# Patient Record
(demographics unavailable — no encounter records)

---

## 2025-02-24 NOTE — PHYSICAL EXAM
[Normal S1, S2] : normal S1, S2 [No Rub] : no rub [No Gallop] : no gallop [Murmur] : murmur [Normal] : alert and oriented, normal memory [de-identified] : l/Vl systolic

## 2025-02-24 NOTE — HISTORY OF PRESENT ILLNESS
[FreeTextEntry1] : The patient is a 69-year-old man referred by primary care provider for cardiology evaluation due to concern for dyspnea on exertion, cough and hyperlipidemia. Patient endorses that he was in his usual state of good health until about 2 weeks ago.  He started developing a cough and then was having intermittent feelings of "gasping".  He states that if he walked a short to moderate distance, he would feel short of breath.  Cough lasted for about a day and then resolved on its own. Since then, he has been feeling better, though still has dyspnea especially after going up inclines or stairs. Dyspnea with exertion at times has been associated with tightness in his chest. No resting symptoms. No complaints of palpitations, dizziness, lightheadedness, syncope or near syncope.  No edema, orthopnea.  No PND.  Past history: No prior history of CAD, MI, CHF, arrhythmias or heart murmurs. He did have an exercise myocardial perfusion stress test done in 2022, normal results (in the chart). Denies hypertension, diabetes or vascular disease. Denies any major medical illnesses. Status post appendectomy about 20 years ago. Current medications: Pantoprazole. Allergies: None. Social history: Non-smoker.  Social alcohol.  He is a  in the [employer], currently working in [employer].  Single, no children.  Native of [place]. Family history: Father and brother had heart disease.

## 2025-02-24 NOTE — DISCUSSION/SUMMARY
[FreeTextEntry1] : 69-year-old man referred for cardiology evaluation due to concern for recent onset of cough and dyspnea with exertion. Patient has hyperlipidemia. No prior cardiac history. On physical examination, blood pressure is stable.  Appears euvolemic.  l/Vl systolic murmur. EKG is normal sinus rhythm, within normal limits. Further evaluation is indicated.  Plan 1.  Chest x-ray to rule out infiltrate. 2.  Echocardiogram to exclude structural heart disease. 3.  Exercise stress test to evaluate functional capacity and evaluate for stress-induced coronary insufficiency or arrhythmias. 4.  Further cardiac recommendations pending above workup. 5.  The above was reviewed with the patient and all of his questions have been answered to his satisfaction. [EKG obtained to assist in diagnosis and management of assessed problem(s)] : EKG obtained to assist in diagnosis and management of assessed problem(s)

## 2025-02-24 NOTE — REASON FOR VISIT
[FreeTextEntry1] : Cardiology consultation for evaluation and management of dyspnea on exertion, cough and hyperlipidemia.